# Patient Record
Sex: MALE | Race: OTHER
[De-identification: names, ages, dates, MRNs, and addresses within clinical notes are randomized per-mention and may not be internally consistent; named-entity substitution may affect disease eponyms.]

---

## 2019-01-10 ENCOUNTER — APPOINTMENT (OUTPATIENT)
Dept: INTERNAL MEDICINE | Facility: CLINIC | Age: 24
End: 2019-01-10
Payer: COMMERCIAL

## 2019-01-10 VITALS — HEART RATE: 115 BPM

## 2019-01-10 VITALS
SYSTOLIC BLOOD PRESSURE: 134 MMHG | WEIGHT: 152 LBS | BODY MASS INDEX: 23.04 KG/M2 | TEMPERATURE: 98.8 F | DIASTOLIC BLOOD PRESSURE: 84 MMHG | HEIGHT: 68 IN

## 2019-01-10 DIAGNOSIS — E86.0 DEHYDRATION: ICD-10-CM

## 2019-01-10 DIAGNOSIS — F17.210 NICOTINE DEPENDENCE, CIGARETTES, UNCOMPLICATED: ICD-10-CM

## 2019-01-10 DIAGNOSIS — K52.9 NONINFECTIVE GASTROENTERITIS AND COLITIS, UNSPECIFIED: ICD-10-CM

## 2019-01-10 DIAGNOSIS — A63.0 ANOGENITAL (VENEREAL) WARTS: ICD-10-CM

## 2019-01-10 PROCEDURE — 99214 OFFICE O/P EST MOD 30 MIN: CPT | Mod: 25

## 2019-01-10 PROCEDURE — 36415 COLL VENOUS BLD VENIPUNCTURE: CPT

## 2019-01-10 NOTE — ASSESSMENT
[FreeTextEntry1] : he needs fluids. if he s not able to tolerate po fluids he must go to [facility] for [facility]. he expressed understanding\par labs today

## 2019-01-10 NOTE — REVIEW OF SYSTEMS
[Fatigue] : fatigue [Abdominal Pain] : abdominal pain [Nausea] : nausea [Diarrhea] : diarrhea [Vomiting] : vomiting [Negative] : Respiratory [Fever] : no fever [Chills] : no chills [Dizziness] : no dizziness [Fainting] : no fainting
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
## 2019-01-10 NOTE — PHYSICAL EXAM
[Ill-Appearing] : ill-appearing [No Respiratory Distress] : no respiratory distress  [Clear to Auscultation] : lungs were clear to auscultation bilaterally [No Accessory Muscle Use] : no accessory muscle use [Regular Rhythm] : with a regular rhythm [Normal S1, S2] : normal S1 and S2 [Soft] : abdomen soft [Non-distended] : non-distended [Normal Bowel Sounds] : normal bowel sounds [Normal Gait] : normal gait [No Focal Deficits] : no focal deficits [Normal Affect] : the affect was normal [Normal Insight/Judgement] : insight and judgment were intact [de-identified] : tachycardic [de-identified] : epigastric tenderness

## 2019-01-14 LAB
ALBUMIN SERPL ELPH-MCNC: 4.7 G/DL
ALP BLD-CCNC: 91 U/L
ALT SERPL-CCNC: 29 U/L
ANION GAP SERPL CALC-SCNC: 17 MMOL/L
AST SERPL-CCNC: 31 U/L
BASOPHILS # BLD AUTO: 0.03 K/UL
BASOPHILS NFR BLD AUTO: 0.4 %
BILIRUB SERPL-MCNC: 0.5 MG/DL
BUN SERPL-MCNC: 9 MG/DL
CALCIUM SERPL-MCNC: 10 MG/DL
CHLORIDE SERPL-SCNC: 102 MMOL/L
CO2 SERPL-SCNC: 19 MMOL/L
CREAT SERPL-MCNC: 0.73 MG/DL
EOSINOPHIL # BLD AUTO: 0.01 K/UL
EOSINOPHIL NFR BLD AUTO: 0.1 %
GLUCOSE SERPL-MCNC: 76 MG/DL
HCT VFR BLD CALC: 50 %
HGB BLD-MCNC: 16.8 G/DL
IMM GRANULOCYTES NFR BLD AUTO: 0.6 %
LYMPHOCYTES # BLD AUTO: 1.45 K/UL
LYMPHOCYTES NFR BLD AUTO: 21.6 %
MAN DIFF?: NORMAL
MCHC RBC-ENTMCNC: 30.4 PG
MCHC RBC-ENTMCNC: 33.6 GM/DL
MCV RBC AUTO: 90.4 FL
MONOCYTES # BLD AUTO: 0.48 K/UL
MONOCYTES NFR BLD AUTO: 7.1 %
NEUTROPHILS # BLD AUTO: 4.71 K/UL
NEUTROPHILS NFR BLD AUTO: 70.2 %
PLATELET # BLD AUTO: 296 K/UL
POTASSIUM SERPL-SCNC: 3.8 MMOL/L
PROT SERPL-MCNC: 8.3 G/DL
RBC # BLD: 5.53 M/UL
RBC # FLD: 13.8 %
SODIUM SERPL-SCNC: 138 MMOL/L
WBC # FLD AUTO: 6.72 K/UL

## 2019-08-01 ENCOUNTER — APPOINTMENT (OUTPATIENT)
Dept: INTERNAL MEDICINE | Facility: CLINIC | Age: 24
End: 2019-08-01

## 2019-08-07 ENCOUNTER — APPOINTMENT (OUTPATIENT)
Dept: INTERNAL MEDICINE | Facility: CLINIC | Age: 24
End: 2019-08-07
Payer: COMMERCIAL

## 2019-08-07 VITALS
WEIGHT: 178 LBS | TEMPERATURE: 100 F | BODY MASS INDEX: 26.98 KG/M2 | DIASTOLIC BLOOD PRESSURE: 80 MMHG | HEIGHT: 68 IN | HEART RATE: 99 BPM | OXYGEN SATURATION: 96 % | SYSTOLIC BLOOD PRESSURE: 110 MMHG

## 2019-08-07 DIAGNOSIS — Z23 ENCOUNTER FOR IMMUNIZATION: ICD-10-CM

## 2019-08-07 DIAGNOSIS — Z00.00 ENCOUNTER FOR GENERAL ADULT MEDICAL EXAMINATION W/OUT ABNORMAL FINDINGS: ICD-10-CM

## 2019-08-07 DIAGNOSIS — R76.11 NONSPECIFIC REACTION TO TUBERCULIN SKIN TEST W/OUT ACTIVE TUBERCULOSIS: ICD-10-CM

## 2019-08-07 PROCEDURE — 90715 TDAP VACCINE 7 YRS/> IM: CPT

## 2019-08-07 PROCEDURE — 99395 PREV VISIT EST AGE 18-39: CPT | Mod: 25

## 2019-08-07 PROCEDURE — 90471 IMMUNIZATION ADMIN: CPT

## 2019-08-07 NOTE — PHYSICAL EXAM
[No Acute Distress] : no acute distress [Well Nourished] : well nourished [Well Developed] : well developed [Well-Appearing] : well-appearing [PERRL] : pupils equal round and reactive to light [Normal Sclera/Conjunctiva] : normal sclera/conjunctiva [Normal Outer Ear/Nose] : the outer ears and nose were normal in appearance [EOMI] : extraocular movements intact [Normal Oropharynx] : the oropharynx was normal [No JVD] : no jugular venous distention [Supple] : supple [No Lymphadenopathy] : no lymphadenopathy [No Accessory Muscle Use] : no accessory muscle use [Thyroid Normal, No Nodules] : the thyroid was normal and there were no nodules present [No Respiratory Distress] : no respiratory distress  [Clear to Auscultation] : lungs were clear to auscultation bilaterally [Normal Rate] : normal rate  [Regular Rhythm] : with a regular rhythm [Normal S1, S2] : normal S1 and S2 [No Murmur] : no murmur heard [No Carotid Bruits] : no carotid bruits [No Varicosities] : no varicosities [No Abdominal Bruit] : a ~M bruit was not heard ~T in the abdomen [Pedal Pulses Present] : the pedal pulses are present [No Palpable Aorta] : no palpable aorta [No Edema] : there was no peripheral edema [No Extremity Clubbing/Cyanosis] : no extremity clubbing/cyanosis [Soft] : abdomen soft [Non Tender] : non-tender [No HSM] : no HSM [Non-distended] : non-distended [No Masses] : no abdominal mass palpated [Normal Bowel Sounds] : normal bowel sounds [Normal Posterior Cervical Nodes] : no posterior cervical lymphadenopathy [Normal Anterior Cervical Nodes] : no anterior cervical lymphadenopathy [No CVA Tenderness] : no CVA  tenderness [No Spinal Tenderness] : no spinal tenderness [No Joint Swelling] : no joint swelling [Grossly Normal Strength/Tone] : grossly normal strength/tone [Coordination Grossly Intact] : coordination grossly intact [No Rash] : no rash [Deep Tendon Reflexes (DTR)] : deep tendon reflexes were 2+ and symmetric [Normal Gait] : normal gait [No Focal Deficits] : no focal deficits [Normal Affect] : the affect was normal [Normal Insight/Judgement] : insight and judgment were intact

## 2019-08-07 NOTE — HISTORY OF PRESENT ILLNESS
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

## 2019-08-07 NOTE — PLAN
[FreeTextEntry1] : Patient will go for fasting blood work\par Initial chest x-ray since he's had a positive PPD even though he's had a BCG vaccination\par Adacel. vaccination given to patient office

## 2019-11-15 ENCOUNTER — OTHER (OUTPATIENT)
Age: 24
End: 2019-11-15

## 2020-01-02 ENCOUNTER — APPOINTMENT (OUTPATIENT)
Dept: INTERNAL MEDICINE | Facility: CLINIC | Age: 25
End: 2020-01-02
Payer: COMMERCIAL

## 2020-01-02 VITALS
HEART RATE: 117 BPM | BODY MASS INDEX: 26.67 KG/M2 | WEIGHT: 176 LBS | OXYGEN SATURATION: 99 % | HEIGHT: 68 IN | DIASTOLIC BLOOD PRESSURE: 80 MMHG | SYSTOLIC BLOOD PRESSURE: 120 MMHG | TEMPERATURE: 99.9 F

## 2020-01-02 DIAGNOSIS — Z11.1 ENCOUNTER FOR SCREENING FOR RESPIRATORY TUBERCULOSIS: ICD-10-CM

## 2020-01-02 DIAGNOSIS — Z01.84 ENCOUNTER FOR ANTIBODY RESPONSE EXAMINATION: ICD-10-CM

## 2020-01-02 DIAGNOSIS — M50.90 CERVICAL DISC DISORDER, UNSPECIFIED, UNSPECIFIED CERVICAL REGION: ICD-10-CM

## 2020-01-02 PROCEDURE — 99214 OFFICE O/P EST MOD 30 MIN: CPT

## 2020-01-02 RX ORDER — METHYLPREDNISOLONE 4 MG/1
4 TABLET ORAL
Qty: 1 | Refills: 0 | Status: ACTIVE | COMMUNITY
Start: 2020-01-02 | End: 1900-01-01

## 2020-01-02 NOTE — PLAN
[FreeTextEntry1] : reviewed pts MRI reports of cervical spine and shows disc bulges C3 to T1 and with the type of pain he is complaining about appears to be neuropathy and possible tendinitis of left elbow.\par He will start above medications until he sees his treating neurologist in the next 2-3 weeks.  Pt is currently seeing pain management and is on medical marijuana
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
## 2020-01-02 NOTE — PHYSICAL EXAM
[Normal] : no acute distress, well nourished, well developed and well-appearing [de-identified] : Left arm pain proximal to left elbow posterior/lateral with palpation

## 2020-01-03 PROBLEM — Z11.1 SCREENING FOR TUBERCULOSIS: Status: ACTIVE | Noted: 2020-01-03

## 2020-01-03 PROBLEM — Z01.84 IMMUNITY STATUS TESTING: Status: ACTIVE | Noted: 2020-01-03

## 2020-01-14 LAB
HBV SURFACE AB SER QL: REACTIVE
M TB IFN-G BLD-IMP: NEGATIVE
MEV IGG FLD QL IA: 100 AU/ML
MEV IGG+IGM SER-IMP: POSITIVE
MUV AB SER-ACNC: POSITIVE
MUV IGG SER QL IA: 155 AU/ML
QUANTIFERON TB PLUS MITOGEN MINUS NIL: 9.16 IU/ML
QUANTIFERON TB PLUS NIL: 0.01 IU/ML
QUANTIFERON TB PLUS TB1 MINUS NIL: 0.02 IU/ML
QUANTIFERON TB PLUS TB2 MINUS NIL: 0.03 IU/ML
RUBV IGG FLD-ACNC: 1.5 INDEX
RUBV IGG SER-IMP: POSITIVE
VZV AB TITR SER: POSITIVE
VZV IGG SER IF-ACNC: 2459 INDEX

## 2020-01-21 ENCOUNTER — APPOINTMENT (OUTPATIENT)
Dept: INTERNAL MEDICINE | Facility: CLINIC | Age: 25
End: 2020-01-21
Payer: COMMERCIAL

## 2020-01-21 VITALS
SYSTOLIC BLOOD PRESSURE: 120 MMHG | WEIGHT: 176 LBS | DIASTOLIC BLOOD PRESSURE: 70 MMHG | HEART RATE: 94 BPM | TEMPERATURE: 97.1 F | OXYGEN SATURATION: 99 % | HEIGHT: 68 IN | BODY MASS INDEX: 26.67 KG/M2

## 2020-01-21 DIAGNOSIS — G54.2 CERVICAL ROOT DISORDERS, NOT ELSEWHERE CLASSIFIED: ICD-10-CM

## 2020-01-21 PROCEDURE — 99214 OFFICE O/P EST MOD 30 MIN: CPT

## 2020-01-21 RX ORDER — GABAPENTIN 300 MG/1
300 CAPSULE ORAL
Qty: 30 | Refills: 0 | Status: ACTIVE | COMMUNITY
Start: 2020-01-02 | End: 1900-01-01

## 2020-01-21 NOTE — PLAN
[FreeTextEntry1] : Long discussion with mother and son as far as the treatment plan he will need.\par he will see Neurosurgeon for consult in a office that will due PT and has pain management for possible epidural.\par Since he is not going to multiple Dr right now and that got cleared up he will continue with gabapentin as this medication has given him significant improvement.\par he will make appointment with neurosurgeon and then pain management in the same office.  Names and numbers given to pt and mother on recommendations from me\par Once he sees Neurosurgeon they will take over with further treatment.\par They will keep me updated on visit

## 2020-01-21 NOTE — HISTORY OF PRESENT ILLNESS
[FreeTextEntry1] : 25 y/o male present for a f/u visit and med renewals  [de-identified] : Pt presents to the office today with his mother.\par He has been taking Gabapentin as given by me and has been helping his neck and arm pains with significant improvement\par After our last discussion pt has not seen neurology for his follow up and has to cancel appointment due to school conflict.